# Patient Record
Sex: FEMALE | Race: WHITE | NOT HISPANIC OR LATINO | URBAN - METROPOLITAN AREA
[De-identification: names, ages, dates, MRNs, and addresses within clinical notes are randomized per-mention and may not be internally consistent; named-entity substitution may affect disease eponyms.]

---

## 2017-01-03 ENCOUNTER — OUTPATIENT (OUTPATIENT)
Dept: OUTPATIENT SERVICES | Facility: HOSPITAL | Age: 67
LOS: 1 days | End: 2017-01-03
Payer: COMMERCIAL

## 2017-01-03 ENCOUNTER — APPOINTMENT (OUTPATIENT)
Age: 67
End: 2017-01-03

## 2017-01-03 ENCOUNTER — APPOINTMENT (OUTPATIENT)
Dept: MRI IMAGING | Facility: CLINIC | Age: 67
End: 2017-01-03

## 2017-01-03 DIAGNOSIS — Z98.89 OTHER SPECIFIED POSTPROCEDURAL STATES: Chronic | ICD-10-CM

## 2017-01-03 DIAGNOSIS — Z00.8 ENCOUNTER FOR OTHER GENERAL EXAMINATION: ICD-10-CM

## 2017-01-03 PROCEDURE — 73721 MRI JNT OF LWR EXTRE W/O DYE: CPT

## 2017-01-06 ENCOUNTER — APPOINTMENT (OUTPATIENT)
Dept: ORTHOPEDIC SURGERY | Facility: CLINIC | Age: 67
End: 2017-01-06

## 2017-01-06 VITALS
WEIGHT: 145 LBS | HEIGHT: 61 IN | SYSTOLIC BLOOD PRESSURE: 134 MMHG | DIASTOLIC BLOOD PRESSURE: 83 MMHG | BODY MASS INDEX: 27.38 KG/M2 | HEART RATE: 64 BPM

## 2017-01-20 ENCOUNTER — APPOINTMENT (OUTPATIENT)
Dept: ORTHOPEDIC SURGERY | Facility: CLINIC | Age: 67
End: 2017-01-20

## 2017-01-20 VITALS
DIASTOLIC BLOOD PRESSURE: 78 MMHG | BODY MASS INDEX: 27.38 KG/M2 | WEIGHT: 145 LBS | HEART RATE: 63 BPM | HEIGHT: 61 IN | SYSTOLIC BLOOD PRESSURE: 176 MMHG

## 2017-01-20 RX ORDER — OXYCODONE 5 MG/1
5 TABLET ORAL
Qty: 20 | Refills: 0 | Status: ACTIVE | COMMUNITY
Start: 2016-12-24

## 2017-01-20 RX ORDER — PRAVASTATIN SODIUM 20 MG/1
20 TABLET ORAL
Qty: 30 | Refills: 0 | Status: ACTIVE | COMMUNITY
Start: 2016-08-30

## 2017-01-20 RX ORDER — METFORMIN HYDROCHLORIDE 500 MG/1
500 TABLET, COATED ORAL
Qty: 60 | Refills: 0 | Status: ACTIVE | COMMUNITY
Start: 2015-08-05

## 2017-01-20 RX ORDER — HYDROXYCHLOROQUINE SULFATE 200 MG/1
200 TABLET, FILM COATED ORAL
Qty: 30 | Refills: 0 | Status: ACTIVE | COMMUNITY
Start: 2016-02-05

## 2017-01-20 RX ORDER — LANSOPRAZOLE, AMOXICILLIN, CLARITHROMYCIN 30-500-500
KIT ORAL
Qty: 112 | Refills: 0 | Status: ACTIVE | COMMUNITY
Start: 2016-11-22

## 2017-02-17 ENCOUNTER — APPOINTMENT (OUTPATIENT)
Dept: ORTHOPEDIC SURGERY | Facility: CLINIC | Age: 67
End: 2017-02-17

## 2017-02-17 VITALS
BODY MASS INDEX: 27.38 KG/M2 | HEIGHT: 61 IN | HEART RATE: 66 BPM | WEIGHT: 145 LBS | DIASTOLIC BLOOD PRESSURE: 50 MMHG | SYSTOLIC BLOOD PRESSURE: 164 MMHG

## 2017-03-20 ENCOUNTER — APPOINTMENT (OUTPATIENT)
Dept: ORTHOPEDIC SURGERY | Facility: CLINIC | Age: 67
End: 2017-03-20
Payer: COMMERCIAL

## 2017-03-20 PROCEDURE — 99213 OFFICE O/P EST LOW 20 MIN: CPT

## 2017-05-01 ENCOUNTER — APPOINTMENT (OUTPATIENT)
Dept: ORTHOPEDIC SURGERY | Facility: CLINIC | Age: 67
End: 2017-05-01

## 2017-05-11 ENCOUNTER — APPOINTMENT (OUTPATIENT)
Dept: ORTHOPEDIC SURGERY | Facility: CLINIC | Age: 67
End: 2017-05-11
Payer: COMMERCIAL

## 2017-05-11 VITALS
DIASTOLIC BLOOD PRESSURE: 84 MMHG | HEIGHT: 61 IN | HEART RATE: 58 BPM | SYSTOLIC BLOOD PRESSURE: 174 MMHG | BODY MASS INDEX: 27.38 KG/M2 | WEIGHT: 145 LBS

## 2017-05-11 DIAGNOSIS — S83.512D SPRAIN OF ANTERIOR CRUCIATE LIGAMENT OF LEFT KNEE, SUBSEQUENT ENCOUNTER: ICD-10-CM

## 2017-05-11 PROCEDURE — 73564 X-RAY EXAM KNEE 4 OR MORE: CPT | Mod: LT

## 2017-05-11 PROCEDURE — 99213 OFFICE O/P EST LOW 20 MIN: CPT

## 2017-12-12 ENCOUNTER — APPOINTMENT (OUTPATIENT)
Dept: ORTHOPEDIC SURGERY | Facility: CLINIC | Age: 67
End: 2017-12-12
Payer: MEDICARE

## 2017-12-12 VITALS
DIASTOLIC BLOOD PRESSURE: 85 MMHG | HEIGHT: 61 IN | HEART RATE: 59 BPM | BODY MASS INDEX: 26.43 KG/M2 | WEIGHT: 140 LBS | SYSTOLIC BLOOD PRESSURE: 176 MMHG

## 2017-12-12 DIAGNOSIS — M25.522 PAIN IN LEFT ELBOW: ICD-10-CM

## 2017-12-12 DIAGNOSIS — S89.92XD UNSPECIFIED INJURY OF LEFT LOWER LEG, SUBSEQUENT ENCOUNTER: ICD-10-CM

## 2017-12-12 DIAGNOSIS — M25.512 PAIN IN LEFT SHOULDER: ICD-10-CM

## 2017-12-12 PROCEDURE — 73564 X-RAY EXAM KNEE 4 OR MORE: CPT | Mod: LT

## 2017-12-12 PROCEDURE — 73030 X-RAY EXAM OF SHOULDER: CPT | Mod: LT

## 2017-12-12 PROCEDURE — 73080 X-RAY EXAM OF ELBOW: CPT | Mod: LT

## 2017-12-12 PROCEDURE — 99214 OFFICE O/P EST MOD 30 MIN: CPT

## 2017-12-12 RX ORDER — ATORVASTATIN CALCIUM 10 MG/1
10 TABLET, FILM COATED ORAL
Qty: 90 | Refills: 0 | Status: ACTIVE | COMMUNITY
Start: 2017-09-12

## 2017-12-12 RX ORDER — MELOXICAM 15 MG/1
15 TABLET ORAL DAILY
Qty: 30 | Refills: 0 | Status: ACTIVE | COMMUNITY
Start: 2017-05-11 | End: 1900-01-01

## 2017-12-12 RX ORDER — CHLORZOXAZONE 500 MG/1
500 TABLET ORAL
Qty: 60 | Refills: 0 | Status: ACTIVE | COMMUNITY
Start: 2017-11-03

## 2017-12-12 RX ORDER — METFORMIN ER 500 MG 500 MG/1
500 TABLET ORAL
Qty: 180 | Refills: 0 | Status: ACTIVE | COMMUNITY
Start: 2017-09-12

## 2018-01-05 ENCOUNTER — APPOINTMENT (OUTPATIENT)
Dept: ORTHOPEDIC SURGERY | Facility: CLINIC | Age: 68
End: 2018-01-05

## 2018-11-29 ENCOUNTER — APPOINTMENT (OUTPATIENT)
Dept: ORTHOPEDIC SURGERY | Facility: CLINIC | Age: 68
End: 2018-11-29
Payer: MEDICARE

## 2018-11-29 VITALS — SYSTOLIC BLOOD PRESSURE: 173 MMHG | DIASTOLIC BLOOD PRESSURE: 75 MMHG | HEART RATE: 62 BPM

## 2018-11-29 PROCEDURE — 73564 X-RAY EXAM KNEE 4 OR MORE: CPT | Mod: LT

## 2018-11-29 PROCEDURE — 99213 OFFICE O/P EST LOW 20 MIN: CPT

## 2022-05-27 ENCOUNTER — EMERGENCY (EMERGENCY)
Facility: HOSPITAL | Age: 72
LOS: 1 days | Discharge: HOME | End: 2022-05-27
Attending: EMERGENCY MEDICINE | Admitting: EMERGENCY MEDICINE
Payer: MEDICARE

## 2022-05-27 VITALS
TEMPERATURE: 98 F | OXYGEN SATURATION: 98 % | HEART RATE: 58 BPM | DIASTOLIC BLOOD PRESSURE: 91 MMHG | SYSTOLIC BLOOD PRESSURE: 169 MMHG | RESPIRATION RATE: 17 BRPM

## 2022-05-27 VITALS
RESPIRATION RATE: 20 BRPM | OXYGEN SATURATION: 99 % | HEIGHT: 61 IN | TEMPERATURE: 97 F | WEIGHT: 134.92 LBS | SYSTOLIC BLOOD PRESSURE: 179 MMHG | HEART RATE: 64 BPM | DIASTOLIC BLOOD PRESSURE: 74 MMHG

## 2022-05-27 DIAGNOSIS — Z98.89 OTHER SPECIFIED POSTPROCEDURAL STATES: Chronic | ICD-10-CM

## 2022-05-27 PROCEDURE — 72125 CT NECK SPINE W/O DYE: CPT | Mod: 26,MA

## 2022-05-27 PROCEDURE — 70450 CT HEAD/BRAIN W/O DYE: CPT | Mod: 26,MA

## 2022-05-27 PROCEDURE — 99284 EMERGENCY DEPT VISIT MOD MDM: CPT | Mod: 25

## 2022-05-27 PROCEDURE — 99284 EMERGENCY DEPT VISIT MOD MDM: CPT | Mod: FS

## 2022-05-27 PROCEDURE — 73130 X-RAY EXAM OF HAND: CPT

## 2022-05-27 PROCEDURE — 70450 CT HEAD/BRAIN W/O DYE: CPT | Mod: MA

## 2022-05-27 PROCEDURE — 73130 X-RAY EXAM OF HAND: CPT | Mod: 26,50

## 2022-05-27 PROCEDURE — 72125 CT NECK SPINE W/O DYE: CPT | Mod: MA

## 2022-05-27 RX ORDER — BACITRACIN ZINC 500 UNIT/G
1 OINTMENT IN PACKET (EA) TOPICAL ONCE
Refills: 0 | Status: COMPLETED | OUTPATIENT
Start: 2022-05-27 | End: 2022-05-27

## 2022-05-27 RX ORDER — ACETAMINOPHEN 500 MG
650 TABLET ORAL ONCE
Refills: 0 | Status: COMPLETED | OUTPATIENT
Start: 2022-05-27 | End: 2022-05-27

## 2022-05-27 RX ADMIN — Medication 1 APPLICATION(S): at 22:05

## 2022-05-27 NOTE — ED ADULT NURSE NOTE - NSICDXPASTSURGICALHX_GEN_ALL_CORE_FT
PAST SURGICAL HISTORY:  H/O cosmetic surgery     Hemorrhoids repaired    History of Cholecystectomy

## 2022-05-27 NOTE — ED PROVIDER NOTE - NSICDXPASTMEDICALHX_GEN_ALL_CORE_FT
PAST MEDICAL HISTORY:  Diabetes Mellitus Type II, Uncontrolled     High Cholesterol     HTN (Hypertension)     Lupus     NAFL (Nonalcoholic Fatty Liver)     Primary hyperparathyroidism Under care of [place] endocrinologist

## 2022-05-27 NOTE — ED ADULT NURSE NOTE - NSICDXPASTMEDICALHX_GEN_ALL_CORE_FT
PAST MEDICAL HISTORY:  Diabetes Mellitus Type II, Uncontrolled     High Cholesterol     HTN (Hypertension)     Lupus     NAFL (Nonalcoholic Fatty Liver)     Primary hyperparathyroidism Under care of [place] endocrinologist

## 2022-05-27 NOTE — ED ADULT TRIAGE NOTE - CHIEF COMPLAINT QUOTE
Patient complaining of fall last night with abrasions and bruising to left side of head, headache, feeling fatigued, with nausea. Patient states takes anticoagulants.

## 2022-05-27 NOTE — ED PROVIDER NOTE - NS ED ATTENDING STATEMENT MOD
This was a shared visit with the VIKY. I reviewed and verified the documentation and independently performed the documented:

## 2022-05-27 NOTE — ED PROVIDER NOTE - NSFOLLOWUPINSTRUCTIONS_ED_ALL_ED_FT
Follow up with pcp  return to er for any worsening symptoms  apply bacitracin to wounds     Concussion, Adult    A series of images showing how the quick head movements of a concussion injure the brain.   A concussion is a brain injury from a hard, direct hit (trauma) to your head or body. This direct hit causes your brain to quickly shake back and forth inside your skull. A concussion may also be called a mild traumatic brain injury (TBI). Healing from this injury can take time.      What are the causes?    This condition is caused by:•A direct hit to your head, such as:  •Running into a player during a game.      •Being hit in a fight.       •Hitting your head on a hard surface.         •A quick and sudden movement of the head or neck, such as in a car crash.        What are the signs or symptoms?    The signs of a concussion can be hard to notice. They may be missed by you, family members, and doctors. You may look fine on the outside but may not act or feel normal.    Physical symptoms     •Headaches.      •Being dizzy.      •Problems with body balance.      •Being sensitive to light or noise.      •Vomiting or feeling like you may vomit.      •Being tired.      •Problems seeing or hearing.      •Not sleeping or eating as you used to.      •Seizure.      Mental and emotional symptoms     •Feeling grouchy (irritable).      •Having mood changes.      •Problems remembering things.      •Trouble focusing your mind (concentrating), organizing, or making decisions.      •Being slow to think, act, react, speak, or read.      •Feeling worried or nervous (anxious).      •Feeling sad (depressed).        How is this treated?    This condition may be treated by:•Stopping sports or activity if you are injured. If you hit your head or have signs of concussion:  •Do not return to sports or activities the same day.      •Get checked by a doctor before you return to your activities.        •Resting your body and your mind.      •Being watched carefully, often at home.    •Medicines to help with symptoms such as:  •Headaches.      •Feeling like you may vomit.      •Problems with sleep.        •Avoiding alcohol and drugs.      •Being asked to go to a concussion clinic or a place to help you recover (rehabilitation center).      Recovery from a concussion can take time. Return to activities only:  •When you are fully healed.      •When your doctor says it is safe.      Avoid taking strong pain medicines (opioids) for a concussion.      Follow these instructions at home:    Activity   •Limit activities that need a lot of thought or focus, such as:  •Homework or work for your job.      •Watching TV.      •Using the computer or phone.      •Playing memory games and puzzles.      •Rest. Rest helps your brain heal. Make sure you:  •Get plenty of sleep. Most adults should get 7–9 hours of sleep each night.      •Rest during the day. Take naps or breaks when you feel tired.        •Avoid activity like exercise until your doctor says its safe. Stop any activity that makes symptoms worse.      • Do not do activities that could cause a second concussion, such as riding a bike or playing sports.      •Ask your doctor when you can return to your normal activities, such as school, work, sports, and driving. Your ability to react may be slower. Do not do these activities if you are dizzy.        General instructions   A bottle of beer, a glass of wine, and a glass of hard liquor with a "do not drink" sign over them.   •Take over-the-counter and prescription medicines only as told by your doctor.      • Do not drink alcohol until your doctor says you can.      •Watch your symptoms and tell other people to do the same. Other problems can occur after a concussion. Older adults have a higher risk of serious problems.      •Tell your , teachers, school nurse, school counselor, , or  about your injury and symptoms. Tell them about what you can or cannot do.      •Keep all follow-up visits as told by your doctor. This is important.        How is this prevented?    It is very important that you do not get another brain injury. In rare cases, another injury can cause brain damage that will not go away, brain swelling, or death. The risk of this is greatest in the first 7–10 days after a head injury. To avoid injuries:  •Stop activities that could lead to a second concussion, such as contact sports, until your doctor says it is okay.    •When you return to sports or activities:  •Do not crash into other players. This is how most concussions happen.      •Follow the rules.       •Respect other players. Do not engage in violent behavior while playing.        •Get regular exercise. Do strength and balance training.      •Wear a helmet that fits you well during sports, biking, or other activities.      •Helmets can help protect you from serious skull and brain injuries, but they do not protect you from a concussion. Even when wearing a helmet, you should avoid being hit in the head.        Contact a doctor if:    •Your symptoms do not get better.      •You have new symptoms.      •You have another injury.        Get help right away if:    •You have bad headaches or your headaches get worse.      •You feel weak or numb in any part of your body.      •You feel mixed up (confused).      •Your balance gets worse.      •You vomit often.      •You feel more sleepy than normal.      •You cannot speak well, or have slurred speech.      •You have a seizure.      •Others have trouble waking you up.      •You have changes in how you act.      •You have changes in how you see (vision).      •You pass out (lose consciousness).      These symptoms may be an emergency. Do not wait to see if the symptoms will go away. Get medical help right away. Call your local emergency services (911 in the [place] Do not drive yourself to the hospital.       Summary    •A concussion is a brain injury from a hard, direct hit (trauma) to your head or body.      •This condition is treated with rest and careful watching of symptoms.      •Ask your doctor when you can return to your normal activities, such as school, work, or driving.      •Get help right away if you have a very bad headache, feel weak in any part of your body, have a seizure, have changes in how you act or see, or if you are mixed up or more sleepy than normal.      This information is not intended to replace advice given to you by your health care provider. Make sure you discuss any questions you have with your health care provider.      Document Revised: 03/03/2022 Document Reviewed: 03/03/2022    Elsevier Patient Education © 2022 [employer]

## 2022-05-27 NOTE — ED ADULT NURSE NOTE - CHPI ED NUR SYMPTOMS NEG
no bleeding/no confusion/no deformity/no fever/no loss of consciousness/no numbness/no tingling/no weakness

## 2022-05-27 NOTE — ED ADULT NURSE NOTE - NSIMPLEMENTINTERV_GEN_ALL_ED
Implemented All Fall Risk Interventions:  [place] to call system. Call bell, personal items and telephone within reach. Instruct patient to call for assistance. Room bathroom lighting operational. Non-slip footwear when patient is off stretcher. Physically safe environment: no spills, clutter or unnecessary equipment. Stretcher in lowest position, wheels locked, appropriate side rails in place. Provide visual cue, wrist band, yellow gown, etc. Monitor gait and stability. Monitor for mental status changes and reorient to person, place, and time. Review medications for side effects contributing to fall risk. Reinforce activity limits and safety measures with patient and family.

## 2022-05-27 NOTE — ED ADULT NURSE NOTE - NSICDXFAMILYHX_GEN_ALL_CORE_FT
FAMILY HISTORY:  Family history of lung cancer, brother     Sibling  Still living? No  Family history of stroke, Age at diagnosis: Age Unknown

## 2022-05-27 NOTE — ED PROVIDER NOTE - OBJECTIVE STATEMENT
Pt is a 70 yo female with pmhx of DM HTN HLD lupus NAFL CAD on plavix here s/p fall yesterday got tangled with dog while walking yesterday denies any loc hit left side of head and b/l hands no loc + headache nausea and feeling tired. Pt took Tylenol pta. Pt denies any vomiting blurry vision loc numbness tingling weakness.

## 2022-05-27 NOTE — ED PROVIDER NOTE - ATTENDING APP SHARED VISIT CONTRIBUTION OF CARE
pt is a  72 yo f who lives at home with her daughter. she as hx of dm htn hld lupus cad on plavix and asa. sp orif left wrist. she was walking her dog yest when she got tangled up and tripped by her dog falling to ground striking head left knee shoulder and both hands.   she had no loc no neuro sx and has hand pain  she came today for eval at the insistence of her daughter  on eval  pt has abrasions to left forehead surrounding left eye and zygoma full eomi no bony pain no nasal swelling no eye injury noted neck tender to pal along paraspinal m  pt has pain to palp left shoulder, but has full rom without visible deformity or injury the left hand has well healed scar on volar left wrist no crepitance or acute deformity, r hadn abrasion to thumb left knee medial knee pain with abrasion full rom  all ext are neurologic and vascular intact  neuro normal  skin as above  heart lungs abd pelvis normal  plan ct head neck xray painful areas bacitracin ointment to abrasions tylenol for pain  on concussion and head injury

## 2022-05-27 NOTE — ED PROVIDER NOTE - PATIENT PORTAL LINK FT
You can access the FollowMyHealth Patient Portal offered by [facility] by registering at the following website: [email or web address]. By joining [facility] [employer]’s FollowMyHealth portal, you will also be able to view your health information using other applications (apps) compatible with our system.

## 2022-05-27 NOTE — ED ADULT NURSE NOTE - OBJECTIVE STATEMENT
Patient is 72yo F BIB daughter with c/o fall while walking dog last night, landed on face. Patient presents with abrasions to left forehead, cheek, and nose. Patient denies LOC, reports she takes aspirin and plavix daily. Patient reports today she felt headache, mild nausea, and tired so her daughter brought her to the ED. Patient also reports mild left shoulder pain, denies at this time, reports she took Tylenol approximately 1 hour PTA.

## 2022-05-27 NOTE — ED PROVIDER NOTE - CONSTITUTIONAL, MLM
normal... Well appearing, awake, alert, oriented to person, place, time/situation and in no apparent distress. abrasion left forehead and left side of nose

## 2022-06-13 ENCOUNTER — APPOINTMENT (OUTPATIENT)
Dept: ORTHOPEDIC SURGERY | Facility: CLINIC | Age: 72
End: 2022-06-13
Payer: MEDICARE

## 2022-06-13 DIAGNOSIS — M79.645 PAIN IN LEFT FINGER(S): ICD-10-CM

## 2022-06-13 PROCEDURE — 99203 OFFICE O/P NEW LOW 30 MIN: CPT

## 2022-06-13 PROCEDURE — 73140 X-RAY EXAM OF FINGER(S): CPT

## 2022-06-13 RX ORDER — ROSUVASTATIN CALCIUM 5 MG/1
5 TABLET, FILM COATED ORAL
Qty: 90 | Refills: 0 | Status: ACTIVE | COMMUNITY
Start: 2022-04-05

## 2022-06-13 RX ORDER — BLOOD SUGAR DIAGNOSTIC
STRIP MISCELLANEOUS
Qty: 200 | Refills: 0 | Status: ACTIVE | COMMUNITY
Start: 2022-01-21

## 2022-06-13 RX ORDER — LOSARTAN POTASSIUM 100 MG/1
100 TABLET, FILM COATED ORAL
Qty: 90 | Refills: 0 | Status: ACTIVE | COMMUNITY
Start: 2022-02-25

## 2022-06-13 RX ORDER — DEXAMETHASONE 6 MG/1
6 TABLET ORAL
Qty: 4 | Refills: 0 | Status: ACTIVE | COMMUNITY
Start: 2021-12-31

## 2022-06-13 RX ORDER — HYDROCORTISONE 25 MG/G
2.5 OINTMENT TOPICAL
Qty: 28 | Refills: 0 | Status: ACTIVE | COMMUNITY
Start: 2022-03-16

## 2022-06-13 NOTE — PHYSICAL EXAM
[de-identified] : Patient is WDWN, alert, and in no acute distress. Breathing is unlabored. She is grossly oriented to person, place, and time.\par \par Left Hand:\par Well healed incision site volarly to the wrist\par Swelling present to the IP joint of the left thumb\par Tenderness noted to the IP joint of the left thumb\par Limitations of motion to the IP joint secondary to injury.\par All other digits are moving well\par Sensation is normal to the digits.  [de-identified] : AP, lateral and oblique views of the LEFT thumb were obtained today and revealed an avulsion fracture to the base of the distal phalanx. 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 \par \par ------------------------------------------------------------------------------------------------------------------------------------------------------------------------------------\par \par EXAM: XR HAND MIN 3 VIEWS BI\par PROCEDURE DATE: 05/27/2022\par IMPRESSION:\par No acute fracture or dislocation.\par \par EVA MONTANA MD; Attending Radiologist\par This document has been electronically signed. May 28 2022 1:11PM

## 2022-06-13 NOTE — DISCUSSION/SUMMARY
[de-identified] : The underlying pathophysiology was reviewed with the patient. XR films were reviewed with the patient. Discussed at length the nature of the patient’s condition. \par \par She was instructed on use of Coban for protection and compression of the thumb. She may begin ROM exercises of the digit and was advised to use the hand normally for ADLs.\par \par All questions answered, understanding verbalized. Patient in agreement with plan of care. Follow up in 3 to 4 weeks, if needed.

## 2022-06-13 NOTE — END OF VISIT
[FreeTextEntry3] : All medical record entries made by the Scribe were at my,  Dr. Ivan Calderon MD., direction and personally dictated by me on 06/13/2022. I have personally reviewed the chart and agree that the record accurately reflects my personal performance of the history, physical exam, assessment and plan.

## 2022-06-13 NOTE — HISTORY OF PRESENT ILLNESS
[de-identified] : Pt is a 72 y/o female with left thumb pain.  She tripped and fell in the street while walking her dog on 5/27/22.  She hit her head as well.  She had pain immediately.  She went to [place] ED where xrays were taken.  They were negative for fracture.  She states that she continues to have pain and swelling in the thumb.  It is difficult to completely flex it.  She has pain when she  or lifts anything.

## 2022-06-13 NOTE — ADDENDUM
[FreeTextEntry1] : I, Elizabeth Edge wrote this note acting as a scribe for Dr. Ivan Calderon on Jun 13, 2022.

## 2023-10-05 ENCOUNTER — APPOINTMENT (OUTPATIENT)
Dept: ORTHOPEDIC SURGERY | Facility: CLINIC | Age: 73
End: 2023-10-05
Payer: MEDICARE

## 2023-10-05 VITALS
TEMPERATURE: 97.5 F | BODY MASS INDEX: 25.49 KG/M2 | OXYGEN SATURATION: 98 % | HEIGHT: 61 IN | SYSTOLIC BLOOD PRESSURE: 170 MMHG | WEIGHT: 135 LBS | DIASTOLIC BLOOD PRESSURE: 73 MMHG | HEART RATE: 56 BPM

## 2023-10-05 DIAGNOSIS — S89.92XA UNSPECIFIED INJURY OF LEFT LOWER LEG, INITIAL ENCOUNTER: ICD-10-CM

## 2023-10-05 PROCEDURE — 99214 OFFICE O/P EST MOD 30 MIN: CPT

## 2023-10-05 PROCEDURE — 73564 X-RAY EXAM KNEE 4 OR MORE: CPT | Mod: LT
